# Patient Record
(demographics unavailable — no encounter records)

---

## 2024-10-31 NOTE — HISTORY OF PRESENT ILLNESS
[FreeTextEntry1] : 35-year-old female -0-1-2 at 2/7 weeks by LMP of 9/10/2024 presents for reconfirm visit.  EDC by LMP is 2025.  Patient did have an episode of vaginal bleeding 2 weeks ago and was seen for a sonogram.  Sonogram showed a gestational sac and yolk sac.  No fetal pole was yet identified.  Patient states that vaginal bleeding has resolved.  She did have 1 episode of dark brown spotting 5 days ago.  She denies any cramping.  She does admit to some breast tenderness.  She is having some nausea that is being controlled with dietary modifications and B6 supplements.  She denies vomiting.  She is taking prenatal vitamins.    Past medical history is significant for asthma and hypothyroidism.  She has never had surgery.  Family history is noncontributory.  Social history is negative x3.  OB history: -0-1-2.  She has a history of 2 full-term vaginal deliveries and a SAB.  GYN history with regular menstrual cycles.  No known drug allergies.  She is taking Qvar.

## 2024-10-31 NOTE — PLAN
[FreeTextEntry1] : 35-year-old female -0-1-2 at 7 and 2/7 weeks by LMP.  Sonogram was performed today by me.  Crown-rump length consistent with dating.  Positive fetal heart noted.  EDC 2025.  Do's and don'ts of pregnancy were discussed with the patient.  We also discussed nausea and vomiting in pregnancy.  The patient is currently having good relief with dietary modifications and B6 supplementation.  She is not interested in other medications at this time.  She is advanced maternal age.  Call parameters were reviewed.  She will return to the office in 3 weeks for her first prenatal visit.  The patient was given the opportunity to ask questions and all were answered to her satisfaction.

## 2025-07-23 NOTE — HISTORY OF PRESENT ILLNESS
[Back to Normal] : is back to normal in size [None] : no vaginal bleeding [Normal] : the vagina was normal [Not Done] : Examination of breasts not done [FreeTextEntry8] : 36-year-old female presents for postpartum visit.  She is status post vaginal delivery.  She has no complaints today.  She is breast-feeding her baby.  She did get her menses last week.  She has not had intercourse since delivery.  She is planning to use condoms for contraception.  She has no symptoms of postpartum anxiety or depression. [de-identified] : 36-year-old female status post vaginal delivery for postpartum visit, doing well [de-identified] : Ok to return to normal activity, intercourse, and exercise.  We reviewed contraception options that are safe with breast-feeding.  Patient would like to use condoms for contraception.  Call parameters were reviewed.  She will return to the office in 8 weeks for her well woman exam.  The patient was given the opportunity to ask questions and all were answered to her satisfaction.